# Patient Record
Sex: FEMALE | Race: OTHER | HISPANIC OR LATINO | URBAN - METROPOLITAN AREA
[De-identification: names, ages, dates, MRNs, and addresses within clinical notes are randomized per-mention and may not be internally consistent; named-entity substitution may affect disease eponyms.]

---

## 2024-07-25 ENCOUNTER — TELEHEALTH PROVIDED OTHER THAN IN PATIENT'S HOME (OUTPATIENT)
Dept: URBAN - METROPOLITAN AREA CLINIC 72 | Facility: CLINIC | Age: 29
End: 2024-07-25

## 2024-07-25 VITALS — WEIGHT: 175 LBS | HEIGHT: 61 IN

## 2024-07-25 DIAGNOSIS — K59.00 CONSTIPATION, UNSPECIFIED: ICD-10-CM

## 2024-07-25 DIAGNOSIS — Z87.59 PERSONAL HISTORY OF OTHER COMPLICATIONS OF PREGNANCY, CHILDB: ICD-10-CM

## 2024-07-25 DIAGNOSIS — Z80.0 FAMILY HISTORY OF MALIGNANT NEOPLASM OF DIGESTIVE ORGANS: ICD-10-CM

## 2024-07-25 NOTE — SERVICEHPINOTES
The patient presents with a chief complaint of constipation for about a week. They have a family history of colon issues, including their mother who has experienced constipation for over 15 years and an aunt who had colon cancer in her early 30s. The patient experienced a similar episode of bloating, pain, and constipation three years ago while living in [place], which lasted around a week and resulted in small, hard pebble-like stools. They have been taking iron, vitamin D, and prenatals due to trying to get pregnant. The patient had an endoscopy and colonoscopy three years ago, which revealed no significant findings other than a couple of hemorrhoids. They recently had a miscarriage in July and are concerned about the potential impact of their stomach or colon on future pregnancies. The patient also reports that their stool has been black in colo

## 2024-07-25 NOTE — SERVICENOTES
Telehealth Platform Used: D[employer]
Location of patient: home
Location of provider: office
Other persons participating:n/a